# Patient Record
Sex: MALE
[De-identification: names, ages, dates, MRNs, and addresses within clinical notes are randomized per-mention and may not be internally consistent; named-entity substitution may affect disease eponyms.]

---
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
## 2025-03-06 ENCOUNTER — OFFICE (OUTPATIENT)
Dept: URBAN - METROPOLITAN AREA CLINIC 12 | Facility: CLINIC | Age: 20
Setting detail: OPHTHALMOLOGY
End: 2025-03-06
Payer: COMMERCIAL

## 2025-03-06 DIAGNOSIS — H01.001: ICD-10-CM

## 2025-03-06 DIAGNOSIS — H01.004: ICD-10-CM

## 2025-03-06 DIAGNOSIS — H16.223: ICD-10-CM

## 2025-03-06 DIAGNOSIS — H52.13: ICD-10-CM

## 2025-03-06 PROCEDURE — 92014 COMPRE OPH EXAM EST PT 1/>: CPT | Performed by: OPHTHALMOLOGY

## 2025-03-06 PROCEDURE — 92015 DETERMINE REFRACTIVE STATE: CPT | Performed by: OPHTHALMOLOGY

## 2025-03-06 ASSESSMENT — REFRACTION_CURRENTRX
OD_SPHERE: -1.75
OS_CYLINDER: -1.50
OS_AXIS: 152
OD_VPRISM_DIRECTION: SV
OD_AXIS: 025
OD_CYLINDER: -1.75
OS_VPRISM_DIRECTION: SV
OS_OVR_VA: 20/
OD_OVR_VA: 20/
OS_SPHERE: -1.75

## 2025-03-06 ASSESSMENT — REFRACTION_AUTOREFRACTION
OS_SPHERE: -1.25
OD_SPHERE: -1.50
OD_CYLINDER: -1.75
OD_CYLINDER: -2.75
OD_SPHERE: -1.25
OD_AXIS: 023
OS_AXIS: 149
OD_AXIS: 026
OS_CYLINDER: -1.25
OS_CYLINDER: -2.25
OS_SPHERE: -1.50
OS_AXIS: 154

## 2025-03-06 ASSESSMENT — CONFRONTATIONAL VISUAL FIELD TEST (CVF)
OD_FINDINGS: FULL
OS_FINDINGS: FULL

## 2025-03-06 ASSESSMENT — LID POSITION - PTOSIS: OD_PTOSIS: RUL 1+

## 2025-03-06 ASSESSMENT — KERATOMETRY
OS_AXISANGLE_DEGREES: 069
OS_K1POWER_DIOPTERS: 41.75
OD_K1POWER_DIOPTERS: 41.50
OD_K2POWER_DIOPTERS: 43.75
OS_K2POWER_DIOPTERS: 43.50
OD_AXISANGLE_DEGREES: 110

## 2025-03-06 ASSESSMENT — REFRACTION_MANIFEST
OD_SPHERE: -2.00
OU_VA: 20/20
OD_AXIS: 025
OS_AXIS: 152
OD_AXIS: 025
OS_VA1: 20/20
OD_AXIS: 025
OS_SPHERE: -1.75
OD_VA1: 20/20
OS_CYLINDER: -1.75
OS_SPHERE: -1.75
OS_VA1: 20/20
OS_CYLINDER: -2.00
OS_SPHERE: -1.50
OS_VA1: 20/20
OS_AXIS: 150
OD_SPHERE: -1.50
OD_VA1: 20/20
OD_CYLINDER: -2.50
OS_CYLINDER: -1.50
OD_SPHERE: -2.00
OD_CYLINDER: -2.00
OD_CYLINDER: -2.50
OD_VA1: 20/20
OS_AXIS: 151

## 2025-03-06 ASSESSMENT — LID EXAM ASSESSMENTS
OS_BLEPHARITIS: T
OD_BLEPHARITIS: T

## 2025-03-06 ASSESSMENT — SUPERFICIAL PUNCTATE KERATITIS (SPK)
OD_SPK: T
OS_SPK: T

## 2025-03-06 ASSESSMENT — TONOMETRY
OS_IOP_MMHG: 12
OD_IOP_MMHG: 15

## 2025-03-06 ASSESSMENT — VISUAL ACUITY
OD_BCVA: 20/20
OS_BCVA: 20/30